# Patient Record
Sex: FEMALE | Race: BLACK OR AFRICAN AMERICAN | NOT HISPANIC OR LATINO | URBAN - METROPOLITAN AREA
[De-identification: names, ages, dates, MRNs, and addresses within clinical notes are randomized per-mention and may not be internally consistent; named-entity substitution may affect disease eponyms.]

---

## 2023-06-10 ENCOUNTER — EMERGENCY (EMERGENCY)
Facility: HOSPITAL | Age: 53
LOS: 1 days | Discharge: HOME | End: 2023-06-10
Attending: EMERGENCY MEDICINE | Admitting: EMERGENCY MEDICINE
Payer: MEDICARE

## 2023-06-10 VITALS
TEMPERATURE: 98 F | RESPIRATION RATE: 18 BRPM | SYSTOLIC BLOOD PRESSURE: 139 MMHG | OXYGEN SATURATION: 100 % | DIASTOLIC BLOOD PRESSURE: 92 MMHG | HEART RATE: 72 BPM

## 2023-06-10 LAB
ALBUMIN SERPL ELPH-MCNC: 4 G/DL — SIGNIFICANT CHANGE UP (ref 3.3–5)
ALP SERPL-CCNC: 88 U/L — SIGNIFICANT CHANGE UP (ref 40–120)
ALT FLD-CCNC: 47 U/L — HIGH (ref 4–33)
ANION GAP SERPL CALC-SCNC: 11 MMOL/L — SIGNIFICANT CHANGE UP (ref 7–14)
AST SERPL-CCNC: 36 U/L — HIGH (ref 4–32)
BASOPHILS # BLD AUTO: 0.07 K/UL — SIGNIFICANT CHANGE UP (ref 0–0.2)
BASOPHILS NFR BLD AUTO: 0.9 % — SIGNIFICANT CHANGE UP (ref 0–2)
BILIRUB SERPL-MCNC: 0.5 MG/DL — SIGNIFICANT CHANGE UP (ref 0.2–1.2)
BUN SERPL-MCNC: 11 MG/DL — SIGNIFICANT CHANGE UP (ref 7–23)
CALCIUM SERPL-MCNC: 9.5 MG/DL — SIGNIFICANT CHANGE UP (ref 8.4–10.5)
CHLORIDE SERPL-SCNC: 102 MMOL/L — SIGNIFICANT CHANGE UP (ref 98–107)
CO2 SERPL-SCNC: 27 MMOL/L — SIGNIFICANT CHANGE UP (ref 22–31)
CREAT SERPL-MCNC: 0.83 MG/DL — SIGNIFICANT CHANGE UP (ref 0.5–1.3)
EGFR: 84 ML/MIN/1.73M2 — SIGNIFICANT CHANGE UP
EOSINOPHIL # BLD AUTO: 0.55 K/UL — HIGH (ref 0–0.5)
EOSINOPHIL NFR BLD AUTO: 6.8 % — HIGH (ref 0–6)
GLUCOSE SERPL-MCNC: 125 MG/DL — HIGH (ref 70–99)
HCT VFR BLD CALC: 39.5 % — SIGNIFICANT CHANGE UP (ref 34.5–45)
HGB BLD-MCNC: 12.6 G/DL — SIGNIFICANT CHANGE UP (ref 11.5–15.5)
IANC: 4.53 K/UL — SIGNIFICANT CHANGE UP (ref 1.8–7.4)
IMM GRANULOCYTES NFR BLD AUTO: 0.2 % — SIGNIFICANT CHANGE UP (ref 0–0.9)
LACTATE SERPL-SCNC: 0.8 MMOL/L — SIGNIFICANT CHANGE UP (ref 0.5–2)
LIDOCAIN IGE QN: 9 U/L — SIGNIFICANT CHANGE UP (ref 7–60)
LYMPHOCYTES # BLD AUTO: 2.31 K/UL — SIGNIFICANT CHANGE UP (ref 1–3.3)
LYMPHOCYTES # BLD AUTO: 28.5 % — SIGNIFICANT CHANGE UP (ref 13–44)
MCHC RBC-ENTMCNC: 29.1 PG — SIGNIFICANT CHANGE UP (ref 27–34)
MCHC RBC-ENTMCNC: 31.9 GM/DL — LOW (ref 32–36)
MCV RBC AUTO: 91.2 FL — SIGNIFICANT CHANGE UP (ref 80–100)
MONOCYTES # BLD AUTO: 0.62 K/UL — SIGNIFICANT CHANGE UP (ref 0–0.9)
MONOCYTES NFR BLD AUTO: 7.7 % — SIGNIFICANT CHANGE UP (ref 2–14)
NEUTROPHILS # BLD AUTO: 4.53 K/UL — SIGNIFICANT CHANGE UP (ref 1.8–7.4)
NEUTROPHILS NFR BLD AUTO: 55.9 % — SIGNIFICANT CHANGE UP (ref 43–77)
NRBC # BLD: 0 /100 WBCS — SIGNIFICANT CHANGE UP (ref 0–0)
NRBC # FLD: 0 K/UL — SIGNIFICANT CHANGE UP (ref 0–0)
PLATELET # BLD AUTO: 206 K/UL — SIGNIFICANT CHANGE UP (ref 150–400)
POTASSIUM SERPL-MCNC: 4 MMOL/L — SIGNIFICANT CHANGE UP (ref 3.5–5.3)
POTASSIUM SERPL-SCNC: 4 MMOL/L — SIGNIFICANT CHANGE UP (ref 3.5–5.3)
PROT SERPL-MCNC: 7 G/DL — SIGNIFICANT CHANGE UP (ref 6–8.3)
RBC # BLD: 4.33 M/UL — SIGNIFICANT CHANGE UP (ref 3.8–5.2)
RBC # FLD: 12.3 % — SIGNIFICANT CHANGE UP (ref 10.3–14.5)
SODIUM SERPL-SCNC: 140 MMOL/L — SIGNIFICANT CHANGE UP (ref 135–145)
WBC # BLD: 8.1 K/UL — SIGNIFICANT CHANGE UP (ref 3.8–10.5)
WBC # FLD AUTO: 8.1 K/UL — SIGNIFICANT CHANGE UP (ref 3.8–10.5)

## 2023-06-10 PROCEDURE — 99285 EMERGENCY DEPT VISIT HI MDM: CPT

## 2023-06-10 PROCEDURE — 74177 CT ABD & PELVIS W/CONTRAST: CPT | Mod: 26,MA

## 2023-06-10 RX ORDER — SODIUM CHLORIDE 9 MG/ML
1000 INJECTION INTRAMUSCULAR; INTRAVENOUS; SUBCUTANEOUS ONCE
Refills: 0 | Status: COMPLETED | OUTPATIENT
Start: 2023-06-10 | End: 2023-06-10

## 2023-06-10 RX ORDER — IOHEXOL 300 MG/ML
30 INJECTION, SOLUTION INTRAVENOUS ONCE
Refills: 0 | Status: COMPLETED | OUTPATIENT
Start: 2023-06-10 | End: 2023-06-10

## 2023-06-10 RX ORDER — ACETAMINOPHEN 500 MG
1000 TABLET ORAL ONCE
Refills: 0 | Status: COMPLETED | OUTPATIENT
Start: 2023-06-10 | End: 2023-06-10

## 2023-06-10 RX ORDER — MORPHINE SULFATE 50 MG/1
4 CAPSULE, EXTENDED RELEASE ORAL ONCE
Refills: 0 | Status: DISCONTINUED | OUTPATIENT
Start: 2023-06-10 | End: 2023-06-10

## 2023-06-10 RX ADMIN — Medication 400 MILLIGRAM(S): at 20:44

## 2023-06-10 RX ADMIN — MORPHINE SULFATE 4 MILLIGRAM(S): 50 CAPSULE, EXTENDED RELEASE ORAL at 23:11

## 2023-06-10 RX ADMIN — SODIUM CHLORIDE 1000 MILLILITER(S): 9 INJECTION INTRAMUSCULAR; INTRAVENOUS; SUBCUTANEOUS at 20:44

## 2023-06-10 RX ADMIN — MORPHINE SULFATE 4 MILLIGRAM(S): 50 CAPSULE, EXTENDED RELEASE ORAL at 20:44

## 2023-06-10 RX ADMIN — IOHEXOL 30 MILLILITER(S): 300 INJECTION, SOLUTION INTRAVENOUS at 20:44

## 2023-06-10 NOTE — ED PROVIDER NOTE - NSICDXPASTMEDICALHX_GEN_ALL_CORE_FT
PAST MEDICAL HISTORY:  DM (diabetes mellitus)     H/O gastric bypass     HLD (hyperlipidemia)     HTN (hypertension)     Uterine fibroid

## 2023-06-10 NOTE — ED PROVIDER NOTE - PROGRESS NOTE DETAILS
Terence Pierre MD PGY-1: Pt reassessed, pain is much better now a 3/10 vs 10/10 when she came in. Awaiting lab results and ct DAVID Todd: Case endorsed. Labs reviewed. CT A/P shows no acute findings "1. No acute CT abnormality. 2. 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 3. Small bilateral fat-containing lumbar hernia is. There is mild fat stranding in the right lumbar hernia, raises possibility of early omental infarct." Pt reassessed, states she feels better but still has pain, abdomen without peritoneal signs. Chronic abdominal pain without any indication for emergent surgical intervention. Results and plan for dc discussed with Dr. Buckley. Pt has f/u with GI scheduled in 2 days on 6/14. Stable for dc home. Strict return precautions.

## 2023-06-10 NOTE — ED PROVIDER NOTE - NSFOLLOWUPINSTRUCTIONS_ED_ALL_ED_FT
Follow up with your PMD within 48-72 hrs. Show copies of your reports given to you. Take all of your medications as previously prescribed.    Please keep your appointment for follow up with GI this week.    If you have any new, worsened or concerning symptoms, please return to the emergency department immediately.

## 2023-06-10 NOTE — ED PROVIDER NOTE - CLINICAL SUMMARY MEDICAL DECISION MAKING FREE TEXT BOX
53-year-old female with history of gastric bypass and uterine fibroid presents for left lower quadrant pain, seen at [facility] a few days ago.  Exam shows exquisite left lower quadrant tenderness, mild epigastric tenderness, bilateral breath sounds, normal S1-S2, 2+ radial pulse bilaterally.  Will obtain labs, lactate, CT abdomen pelvis with IV and oral contrast given history of bypass (CT abdomen pelvis at [facility] was only without IV contrast),  analgesia with ofirmev. Dispo pending results.

## 2023-06-10 NOTE — ED PROVIDER NOTE - PATIENT PORTAL LINK FT
You can access the FollowMyHealth Patient Portal offered by  by registering at the following website: [email or web address]. By joining [employer]’s FollowMyHealth portal, you will also be able to view your health information using other applications (apps) compatible with our system.

## 2023-06-10 NOTE — ED PROVIDER NOTE - OBJECTIVE STATEMENT
53-year-old female with PMH DM not on metformin on insulin, HTN, HLD, gastric bypass in 2015 at [facility], vertigo, uterine fibroid presents for left lower quadrant pain worsening for the past 1 week.  Patient was seen at [facility] and had a CAT scan that showed left lumbar hernia and right lumbar hernia, possible enteritis and ultrasounds that showed uterine fibroid.  We will patient was discharged and given a prescription of Percocet for pain.  Patient followed up with her gynecologist this past Thursday for the fibroid.  Patient denies nausea, vomiting, fever, chills, chest pain, shortness of breath, dysuria, constipation, diarrhea, hematochezia, hematuria.  Patient denies vaginal bleeding, pelvic pain, abnormal discharge.  Patient took her prescribed Percocet at 4 AM today with about 1 hour of relief however the pain returned.  Patient has had a fibroid embolization procedure in the past and endorses a doctor telling her that it might not have worked.

## 2023-06-10 NOTE — ED ADULT NURSE NOTE - OBJECTIVE STATEMENT
54 y/o female, a&ox4, ambulatory with cane, received to intake rm 6. Pt c/o lower left abdominal pain, Past medical history of DM, insulin dependent, small intestine gastritis, and uterine fibroids. Abdomen is soft, tender, non-rigid, skin intact. Denies CP, SOB, or dyspnea at this time. Respirations are even and unlabored, no signs of respiratory distress. 20GIV placed to LAC, labs collected and sent off. Pt medicated as per MD orders.

## 2023-06-10 NOTE — ED PROVIDER NOTE - PHYSICAL EXAMINATION
GENERAL: well appearing in no acute distress, non-toxic appearing  HEAD: normocephalic, atraumatic  HEENT: normal conjunctiva, oral mucosa moist  CARDIAC: regular rate and rhythm, normal S1S2, no appreciable murmurs, 2+ pulses in UE b/l  PULM: normal breath sounds, clear to ascultation bilaterally, no rales, rhonchi, wheezing  GI: abdomen nondistended, soft, +TTP in LLQ and epigastrium  :  no suprapubic tenderness  NEURO: normal speech, PERRL, EOMI, AAOx3  MSK: no peripheral edema, no calf tenderness b/l  SKIN: well-perfused, extremities warm, no visible rashes  PSYCH: appropriate mood and affect

## 2023-06-10 NOTE — ED ADULT TRIAGE NOTE - CHIEF COMPLAINT QUOTE
c/o LLQ abd pain sent in by urgent care for further eval, pt reports was recently diagnosed with Fibroid and  small intestine enteritis. denies blood in bowel movements, denies constipation. HX of DM, high cholesterol, HTN.

## 2023-06-11 VITALS
OXYGEN SATURATION: 100 % | RESPIRATION RATE: 17 BRPM | TEMPERATURE: 98 F | SYSTOLIC BLOOD PRESSURE: 137 MMHG | DIASTOLIC BLOOD PRESSURE: 86 MMHG | HEART RATE: 54 BPM

## 2023-06-11 RX ORDER — OXYCODONE HYDROCHLORIDE 5 MG/1
10 TABLET ORAL ONCE
Refills: 0 | Status: DISCONTINUED | OUTPATIENT
Start: 2023-06-11 | End: 2023-06-11

## 2023-06-11 RX ADMIN — OXYCODONE HYDROCHLORIDE 10 MILLIGRAM(S): 5 TABLET ORAL at 02:18
